# Patient Record
(demographics unavailable — no encounter records)

---

## 2024-10-08 NOTE — HISTORY OF PRESENT ILLNESS
[FreeTextEntry1] : FU on h/o L renal mass. S/p Left laparoscopic radical nephrectomy on 10/2/24. Drain came out already at home with mildly purulent leakage from drain placement site, NO gross blood from drain.    Berna MR abdomen 8/27/24 Left kidney large complex heterogeneous lesion upper pole 9/6x7.1x6.7 cm and 6 cm tumor thrombus left renal vein  CT Chest 9/17/24 No suspicious pulmonary nodules.

## 2024-10-08 NOTE — PHYSICAL EXAM
[Normal Appearance] : normal appearance [Well Groomed] : well groomed [General Appearance - In No Acute Distress] : no acute distress [Edema] : no peripheral edema [] : no respiratory distress [Respiration, Rhythm And Depth] : normal respiratory rhythm and effort [Exaggerated Use Of Accessory Muscles For Inspiration] : no accessory muscle use [Abdomen Soft] : soft [Abdomen Tenderness] : non-tender [Costovertebral Angle Tenderness] : no ~M costovertebral angle tenderness [Oriented To Time, Place, And Person] : oriented to person, place, and time [de-identified] : Healing Abd incision site clear dry and intact. NO sign of infection. Mildly purulent drainage from drinage placement site.

## 2024-10-18 NOTE — HISTORY OF PRESENT ILLNESS
[FreeTextEntry1] : History of Left renal mass requiring surgery  Lap radical nephrectomy done on Oct 3rd  Staples in place

## 2024-10-18 NOTE — ASSESSMENT
[FreeTextEntry1] : Ja is discouraged today She is c/o fatigue and decrease appetite She is frustrated that she cannot do household chores and is depending on her children too much  She is usually "on the move " and now is weak and taking naps  We discussed all of that today and reassured this is all expected and that it will improve over the next 2-3 weeks  I removed her staples today and applied steri strips  I\We discussed the pathology  Pathology  RCC pT3a FG 4 Referred to Dr Rooney for possible immunotherapy " Ketruda "  Scans follow up as per oncology  Follow up with Dr rodriguez in 6 months  Discussed a healthy diet , decrease protien , no added salt , lots of fish, chicken , fruits and vegetables  Follow up in 6 months

## 2024-10-18 NOTE — REVIEW OF SYSTEMS
[Feeling Poorly] : feeling poorly [Feeling Tired] : feeling tired [Shortness Of Breath] : shortness of breath [Negative] : Musculoskeletal

## 2024-11-06 NOTE — HISTORY OF PRESENT ILLNESS
[T: ___] : T[unfilled] [N: ___] : N[unfilled] [de-identified] : 74 year old F with PMHx of HTN who was recently diagnosed with RCC. Underwent Left laparoscopic radical nephrectomy on 10/02/2024 with Dr. Jimenez.  Patient got food poisoning from raw oysters in early summer with symptoms lasting longer than expected. U/S showed a possible large exophytic renal mass arising from the upper pole of the L kidney possibly invading the spleen. MRI confirmed a large complex heterogeneous lesion upper pole L kidney, 9.6 x 7.1 x 6.7 cm and a 6 cm tumor thrombus left renal vein.  On 10/2/24 patient had a left laparoscopic radical nephrectomy. 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 Twenty lymph nodes negative for carcinoma. All margins negative for invasive carcinoma.   Ms. Isaac presents today for initial consultation for systemic treatment of her renal cell carcinoma. She is doing well since the surgery and states that she tolerated surgical pain very well. Endorses continued poor appetite and low energy levels, however her energy is slowly improving. States that her appetite is still low, but she is still maintaining her weight. Inquired about the stage of her cancer.

## 2024-11-06 NOTE — ADDENDUM
[FreeTextEntry1] : This note was written by Mini Lancaster on 11/06/2024, acting solely as a scribe for Dr. Petr Gill MD. I have documented the information dictated during the patient encounter for the following sections: RFV, HPI, ROS, PE, ASSESSMENT/PLAN.   I personally performed the services described in the documentation, reviewed the documentation recorded by the scribe in my presence, and it accurately and completely records my words and actions.

## 2024-11-06 NOTE — ASSESSMENT
[FreeTextEntry1] : 74 year old F with PMHx of HTN who was recently diagnosed with RCC. Underwent Left laparoscopic radical nephrectomy on 10/02/2024 with Dr. Jimenez.  # Stage III Left Kidney Renal Cell Carcinoma  - 10/02/2024 Left radical nephrectomy Pathology: 8.2 cm clear cell renal cell carcinoma, grade G4, completely excised with negative margins on pathology. No evidence of necrosis. 0/20 + Lymph nodes. pT3aN0 - 11/06/2024 LABS: unremarkable - Given stage III dsiease with clear cell histology, she would benefit from adjuvant systemic with Pembrolizumab - q 3 weeks for 1 year to reduce the risk of recurrence - Reviewed risks, benefits, and possible adverse effects of the treatment regimen - Systemic immunotherapy treatment education provided - RTO for immunotherapy

## 2024-11-06 NOTE — HISTORY OF PRESENT ILLNESS
[T: ___] : T[unfilled] [N: ___] : N[unfilled] [de-identified] : 74 year old F with PMHx of HTN who was recently diagnosed with RCC. Underwent Left laparoscopic radical nephrectomy on 10/02/2024 with Dr. Jimenez.  Patient got food poisoning from raw oysters in early summer with symptoms lasting longer than expected. U/S showed a possible large exophytic renal mass arising from the upper pole of the L kidney possibly invading the spleen. MRI confirmed a large complex heterogeneous lesion upper pole L kidney, 9.6 x 7.1 x 6.7 cm and a 6 cm tumor thrombus left renal vein.  On 10/2/24 patient had a left laparoscopic radical nephrectomy. 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 Twenty lymph nodes negative for carcinoma. All margins negative for invasive carcinoma.   Ms. Isaac presents today for initial consultation for systemic treatment of her renal cell carcinoma. She is doing well since the surgery and states that she tolerated surgical pain very well. Endorses continued poor appetite and low energy levels, however her energy is slowly improving. States that her appetite is still low, but she is still maintaining her weight. Inquired about the stage of her cancer.

## 2024-11-06 NOTE — PHYSICAL EXAM
[Fully active, able to carry on all pre-disease performance without restriction] : Status 0 - Fully active, able to carry on all pre-disease performance without restriction [Normal] : affect appropriate [de-identified] : well healed surgical excision site

## 2024-11-06 NOTE — RESULTS/DATA
[FreeTextEntry1] : *** 10/02/2024 - SURGERY*** Left laparoscopic radical nephrectomy *** 10/02/2024 - PATHOLOGY*** Specimen(s) Submitted 1.) Left kidney 2.) Hernia 3.) Left hilar lymph nodes Final Diagnosis 1.) Kidney, left, radical nephrectomy - Clear cell renal cell carcinoma, WHO/ISUP Grade 4. - See synoptic summary. 2.) Hernia, excision - Unremarkable fibroadipose tissue, consistent with hernia. 3.) Lymph nodes, left hilar, excision - Twenty lymph nodes negative for carcinoma (0/20). - Unremarkable fragment of ureter.  Tumor - Tumor Focality:   Unifocal - Tumor Site:  Upper pole;  Middle - Tumor Size:  8.2 Centimeters (cm) - Histologic Type:   Clear cell renal cell carcinoma - Histologic Grade (WHO / ISUP):   G4, extreme nuclear pleomorphism and / or multinucleated giant cells and / or rhabdoid and / or sarcomatoid differentiation - Nuclear pleomorphism, multinucleated giant cells, rhabdoid, and sarcomatoid differentiation - Tumor Extent:   Extends into perinephric tissue (beyond renal capsule); Extends into renal sinus; Extends into pelvicalyceal system; Extends into renal vein or its segmental branches - Histologic Features:   Sarcomatoid or rhabdoid features not identified - Tumor Necrosis:   Not identified - Lymphatic and / or Vascular Invasion:    Not identified Margins - Margin Status:   All margins negative for invasive carcinoma Regional Lymph Nodes - Regional Lymph Node Status:   All regional lymph nodes negative for tumor - Number of Lymph Nodes Examined:   20 pTNM Classification (AJCC 8th Edition) - pT Category:   pT3a - pN Category:   pN0 Additional Findings - Additional Findings in Kidney:   No major abnormalities

## 2024-11-06 NOTE — REVIEW OF SYSTEMS
[Diarrhea: Grade 0] : Diarrhea: Grade 0 [Negative] : Allergic/Immunologic [FreeTextEntry2] : fatigue, expected with surgical recovery [FreeTextEntry7] : decreased appetite, expected with surgical recovery

## 2024-11-06 NOTE — PHYSICAL EXAM
[Fully active, able to carry on all pre-disease performance without restriction] : Status 0 - Fully active, able to carry on all pre-disease performance without restriction [Normal] : affect appropriate [de-identified] : well healed surgical excision site

## 2024-11-06 NOTE — HISTORY OF PRESENT ILLNESS
[T: ___] : T[unfilled] [N: ___] : N[unfilled] [de-identified] : 74 year old F with PMHx of HTN who was recently diagnosed with RCC. Underwent Left laparoscopic radical nephrectomy on 10/02/2024 with Dr. Jimenez.  Patient got food poisoning from raw oysters in early summer with symptoms lasting longer than expected. U/S showed a possible large exophytic renal mass arising from the upper pole of the L kidney possibly invading the spleen. MRI confirmed a large complex heterogeneous lesion upper pole L kidney, 9.6 x 7.1 x 6.7 cm and a 6 cm tumor thrombus left renal vein.  On 10/2/24 patient had a left laparoscopic radical nephrectomy. 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 Twenty lymph nodes negative for carcinoma. All margins negative for invasive carcinoma.   Ms. Isaac presents today for initial consultation for systemic treatment of her renal cell carcinoma. She is doing well since the surgery and states that she tolerated surgical pain very well. Endorses continued poor appetite and low energy levels, however her energy is slowly improving. States that her appetite is still low, but she is still maintaining her weight. Inquired about the stage of her cancer.

## 2024-11-06 NOTE — PHYSICAL EXAM
[Fully active, able to carry on all pre-disease performance without restriction] : Status 0 - Fully active, able to carry on all pre-disease performance without restriction [Normal] : affect appropriate [de-identified] : well healed surgical excision site

## 2025-04-10 NOTE — HISTORY OF PRESENT ILLNESS
[FreeTextEntry1] : pt s/ p left partial grade 4  t3a  8.2 cm and negative nodes.  saw oncology and refused ketruda.  feeling improved.  fatigued and did not eat.  weight stabilized and nausea resoleved.  ct negative. plkan ct abd and chest in 6 months.